# Patient Record
Sex: FEMALE | Race: BLACK OR AFRICAN AMERICAN | NOT HISPANIC OR LATINO | Employment: FULL TIME | URBAN - METROPOLITAN AREA
[De-identification: names, ages, dates, MRNs, and addresses within clinical notes are randomized per-mention and may not be internally consistent; named-entity substitution may affect disease eponyms.]

---

## 2018-01-20 ENCOUNTER — OFFICE VISIT (OUTPATIENT)
Dept: URGENT CARE | Age: 23
End: 2018-01-20
Payer: COMMERCIAL

## 2018-01-20 PROCEDURE — G0382 LEV 3 HOSP TYPE B ED VISIT: HCPCS | Performed by: FAMILY MEDICINE

## 2022-11-10 NOTE — PROGRESS NOTES
Assessment   1  Sinusitis, acute (461 9) (J01 90)    Plan   PMH: No pertinent past surgical history, Sinusitis, acute    · Amoxicillin-Pot Clavulanate 875-125 MG Oral Tablet (Augmentin); TAKE 1 TABLET    EVERY 12 HOURS DAILY   · Continue with our present treatment plan ; Status:Complete;   Done: [id number]   · Drink plenty of fluids ; Status:Complete;   Done: 51NTZ0175   · Resume activity to your tolerance ; Status:Complete;   Done: 99TDL0390   · Use a cool mist humidifier in the room ; Status:Complete;   Done: [id number]   · We suggest that you try a probiotic supplement ; Status:Complete;   Done: 55PWI3018    Discussion/Summary   Discussion Summary:    Continue over-the-counter medications as needed for symptomatic care  Medication Side Effects Reviewed: Possible side effects of new medications were reviewed with the patient/guardian today  Understands and agrees with treatment plan: The treatment plan was reviewed with the patient/guardian  The patient/guardian understands and agrees with the treatment plan    Counseling Documentation With [facility]: The patient was counseled regarding instructions for management,-- prognosis,-- patient and family education,-- impressions,-- risks and benefits of treatment options,-- importance of compliance with treatment  Follow Up Instructions: Follow Up with your Primary Care Provider in 3-4 days  If your symptoms worsen, go to the nearest [place] [address] Emergency Department  Chief Complaint   1  Cough  Chief Complaint Free Text Note Form: cough,fever, headaches x1 week is taking OTC meds for her symptoms      History of Present Illness   HPI: Patient has had a he cough with fever, headaches and congestion for the past week  She is not getting a better despite taking over-the-counter medications  Hospital Based Practices Required Assessment:      Pain Assessment      the patient states they do not have pain        Abuse And Domestic Violence Screen       Yes, the Community Health Worker Note: Telephone Call  Oncology Clinic     Data/Intervention:  Patient Name:  Jennifer Cervantes  /Age: [birth year]     Call From: Triage Nurse        Reason for Call:  Transportation      Assessment:  CHW called [employer]  ([phone] )  to arrange ride through patient's insurance.  [employer] arranged a round trip ride as a will call.  Patient will need to call when ready for return ride home [phone]. Talked to patient and she will call [employer] to setup  time     Plan:  Patient is aware of the transportation plan.  available to assist with any other needs.      Isaura OTTO Community Health Worker  Clinic Care Coordination  [facility]  Phone: [phone]                 patient is safe at home  -- The patient states no one is hurting them  Depression And Suicide Screen  No, the patient has not had thoughts of hurting themself  No, the patient has not felt depressed in the past 7 days  Prefered Language is  [place]  Primary Language is  English  Cough: RUTH ANN WILLIS presents with complaints of cough  Associated symptoms include fever-- and-- stuffy nose, but-- no dyspnea,-- no wheezing,-- no chills,-- no runny nose,-- no sore throat,-- no myalgias,-- no pleuritic chest pain,-- no chest pain,-- no vomiting-- and-- no heartburn  Review of Systems   Focused-Female:      Constitutional: as noted in HPI       ENT: as noted in HPI  Respiratory: as noted in HPI  Past Medical History   1  No pertinent past medical history   2  History of No pertinent past surgical history  Active Problems And Past Medical History Reviewed: The active problems and past medical history were reviewed and updated today  Current Meds    1  No Reported Medications Recorded  Medication List Reviewed: The medication list was reviewed and updated today  Allergies   1  No Known Drug Allergies    Vitals   Signs   Recorded: [id number] 03:56PM   Temperature: 97 7 F  Heart Rate: 71  Respiration: 16  Systolic: 338  Diastolic: 70  Height: 5 ft 5 in  Weight: 200 lb   BMI Calculated: 33 28  BSA Calculated: 1 98  O2 Saturation: 100  LMP: [id number]    Physical Exam        Constitutional      General appearance: No acute distress, well appearing and well nourished  Eyes      Conjunctiva and lids: No swelling, erythema or discharge  Pupils and irises: Equal, round and reactive to light  Ears, Nose, Mouth, and Throat      External inspection of ears and nose: Normal        Otoscopic examination: Tympanic membranes translucent with normal light reflex  Canals patent without erythema  -- Bilateral nasal congestion and erythema with mucopurulent drainage  -- Bilateral tonsillar erythema with no soft tissue swelling no exudate  Pulmonary      Respiratory effort: No increased work of breathing or signs of respiratory distress  Auscultation of lungs: Clear to auscultation  Cardiovascular      Auscultation of heart: Normal rate and rhythm, normal S1 and S2, without murmurs  Lymphatic      Palpation of lymph nodes in neck: Abnormal   bilateral anterior cervical node enlargement, but-- no posterior cervical node enlargement,-- no submandibular node enlargement-- and-- no supraclavicular node enlargement        Psychiatric      Orientation to person, place, and time: Normal        Mood and affect: Normal        Signatures    Electronically signed by : RICHARD Steve; Jan 20 2018  6:05PM EST                       (Author)     Electronically signed by : Jay Quigley DO; Jan 22 2018  8:29AM EST                       (Co-author)